# Patient Record
(demographics unavailable — no encounter records)

---

## 2024-12-03 NOTE — PHYSICAL EXAM
[Normocephalic] : normocephalic [Atraumatic] : atraumatic [EOMI] : extra ocular movement intact [PERRL] : pupils equal, round and reactive to light [Sclera nonicteric] : sclera nonicteric [Conjunctiva pink] : conjunctiva pink [Supple] : supple [No Supraclavicular Adenopathy] : no supraclavicular adenopathy [No Cervical Adenopathy] : no cervical adenopathy [Examined in the supine and seated position] : examined in the supine and seated position [Symmetrical] : symmetrical [Bra Size: ___] : Bra Size: [unfilled] [None] : no ptosis [No dominant masses] : no dominant masses left breast [No Nipple Retraction] : no left nipple retraction [No Nipple Discharge] : no left nipple discharge [No Axillary Lymphadenopathy] : no left axillary lymphadenopathy [No Edema] : no edema [de-identified] : open granulation tissue fully healed, scar tissue noted in area, no palpable masses on exam

## 2024-12-03 NOTE — PAST MEDICAL HISTORY
[Menarche Age ____] : age at menarche was [unfilled] [History of Hormone Replacement Treatment] : has no history of hormone replacement treatment [Irregular Cycle Intervals] : are  irregular [Total Preg ___] : G[unfilled] [Live Births ___] : P[unfilled]  [Age At Live Birth ___] : Age at live birth: [unfilled] [FreeTextEntry5] : c section  [FreeTextEntry6] : denies  [FreeTextEntry7] : denies  [FreeTextEntry8] : yes for 6 months

## 2024-12-03 NOTE — HISTORY OF PRESENT ILLNESS
[FreeTextEntry1] : Problem List:  1. Right breast granulomatous mastitis  -granulomastous mastitis confirmed on biopsy R breast- CONCORDANT -MRI 7. 1 X 4.9 X 6. 4 cm  2. Right lymph node reactive - biopsy, CONCORDANT  3. 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   (23): Patient presents for follow up. Feels has been worsening since last visit. Taking the celebrex and doxycycline, vomited after taking celebrex once, but realized did not eat before. Has been eating before taking medicine and has been tolerating well.    (23): Patient presents for follow up. Feels it has worsened since last. Using hydrocortisone ointment at home with xeroform.   (24): Patient presents for follow up. Had tele visit with rheumatology, went for blood work they requested has to follow back up for discussion of methotrexate and was started on rifampin.  Feels has not worsened since last visit. Also had US biopsy done of breast and lymph node.   (24): Patient presents for follow up. Has been started on methotrexate by rheumatologist on her 2nd week. Feels symptoms have remained stable, not worsening.   (24): Patient presents for follow up. Denies any pain, symptoms have remained stable. On methotrexate   (24): Patient presents for follow up. Denies pain, symptoms remained stable/small improvement. On methotrexate. Patient had MRI preformed.  (24): Patient presents for follow up. Feels wound is getting smaller, Finished last dose of methotrexate today, reached out to rheumatologist who discussed stopping methotrexate and starting rifampin.   (24): Patient reports improvement of all symptoms. Has not been on methotrexate for the past month and never started rifampin. She ran out of xeroform dressings, past two days was using cortisone over the area. Noticed lump in inframammary fold about a week ago that has gotten larger  (24): Patient with improvement of symptoms.    (24): Patient with continued improvement of symptoms   (24): Patient presents for follow up. States she is currently 10 weeks pregnant. Is wondering if she is able to breastfeed from right breast. Also felt lump in right breast one month ago that was new.   HPI: Patient is a 30-year-old female presenting for initial consultation on 23 for right breast infection. Patient states one month ago she started experiencing pain in the right breast then shortly after started becoming red/swollen. She went to her obgyn doctor who prescribed her doxycycline. She then noticed an open ulceration to the breast, prompting visit to the ED. The ED started her on Bactrim which she is currently still taking. Patient does report improvement in symptoms since it started.  She underwent an ultrasound on 23 that demonstrated mastitis with reactive adenopathy. Patient noticed yesterday from left breast white milky nipple discharge when manipulating breast, denies nipple discharge on right. States last breastfeed in .   Imagin23: [facility]: Breast Ultrasound Right Complete: Impression - Overall no significant change to suspect a mastitis with reactive adenopathy. No drainable fluid collection noted. Follow up ultrasound in one month advised to document improvement. BI-RADS 3   23: [facility] Case: Ultrasound Biopsy Right Breast: Impression - granulomatous mastitis with abscess, CONCORDANT  Ultrasound Biopsy Right Lymph Node: Impression - reactive lymph node, CONCORDANT, marking clips noted placed in breast/lymph node   24: [facility]: MRI Breast: Impression - Irregular enhancing 7. 1 X 4.9 X 6. 4 cm mass occupying the majority of the lower to central right breast with skin involvement, corresponding to biopsy proven GM with abscess. Right axillary and internal mammary lymphadenopathy status post biopsy revealing reactive lymphadenopathy, likely secondary to GM. Probably benign 0. 5 cm enhancing mass in the upper outer left breast likely fibroadenoma, Recommend follow up MRI in 6 months. BI-RADS 3   24: [facility]: Diagnostic Mammogram Bilateral and US Breast Limited Right: Density D, Impression - Contiguous collections in the right breast from 2-6:00 consistent with history of GM. BI-RADS 2

## 2024-12-03 NOTE — ASSESSMENT
[FreeTextEntry1] : Patient is a 30 year old female presenting for right breast GM confirmed on biopsy, symptoms began in november. On methotrexate by rheumatologist.  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 Unable to repeat MRI now since patient is pregnant contrast not indicated in pregnancy.   Discussed patient is able to breastfeed no contraindications.   Follow up in 6 months   Patient verbalized understanding of all treatment plans. All of her questions were answered to the best of my ability. She was encouraged to call the office for any questions or concerns.   Plan 1. US right breast now 2. follow up 6 months